# Patient Record
Sex: MALE | URBAN - METROPOLITAN AREA
[De-identification: names, ages, dates, MRNs, and addresses within clinical notes are randomized per-mention and may not be internally consistent; named-entity substitution may affect disease eponyms.]

---

## 2017-10-26 ENCOUNTER — APPOINTMENT (RX ONLY)
Dept: URBAN - METROPOLITAN AREA CLINIC 15 | Facility: CLINIC | Age: 67
Setting detail: DERMATOLOGY
End: 2017-10-26

## 2017-10-26 DIAGNOSIS — Z41.9 ENCOUNTER FOR PROCEDURE FOR PURPOSES OTHER THAN REMEDYING HEALTH STATE, UNSPECIFIED: ICD-10-CM

## 2017-10-26 PROCEDURE — ? BOTOX

## 2017-10-26 PROCEDURE — ? COSMETIC CONSULTATION: BOTULINUM TOXIN

## 2017-10-26 PROCEDURE — ? ADDITIONAL NOTES

## 2017-10-26 PROCEDURE — ? COSMETIC CONSULTATION: FILLERS

## 2017-10-26 PROCEDURE — ? FILLERS

## 2017-10-26 ASSESSMENT — LOCATION DETAILED DESCRIPTION DERM
LOCATION DETAILED: LEFT SUPERIOR VERMILION BORDER
LOCATION DETAILED: LEFT INFERIOR MEDIAL BUCCAL CHEEK
LOCATION DETAILED: LEFT LATERAL MALAR CHEEK
LOCATION DETAILED: RIGHT LOWER CUTANEOUS LIP
LOCATION DETAILED: LEFT UPPER CUTANEOUS LIP
LOCATION DETAILED: RIGHT INFERIOR MEDIAL MALAR CHEEK
LOCATION DETAILED: LEFT INFERIOR MEDIAL MALAR CHEEK
LOCATION DETAILED: RIGHT CENTRAL MALAR CHEEK
LOCATION DETAILED: LEFT MEDIAL MALAR CHEEK
LOCATION DETAILED: RIGHT UPPER CUTANEOUS LIP
LOCATION DETAILED: RIGHT LATERAL MALAR CHEEK
LOCATION DETAILED: RIGHT ORAL COMMISSURE
LOCATION DETAILED: LEFT LOWER CUTANEOUS LIP
LOCATION DETAILED: RIGHT MID PREAURICULAR CHEEK
LOCATION DETAILED: RIGHT SUPERIOR MEDIAL BUCCAL CHEEK
LOCATION DETAILED: RIGHT MEDIAL MALAR CHEEK

## 2017-10-26 ASSESSMENT — LOCATION SIMPLE DESCRIPTION DERM
LOCATION SIMPLE: LEFT LIP
LOCATION SIMPLE: LEFT UPPER LIP
LOCATION SIMPLE: LEFT CHEEK
LOCATION SIMPLE: RIGHT LIP
LOCATION SIMPLE: RIGHT CHEEK

## 2017-10-26 ASSESSMENT — LOCATION ZONE DERM
LOCATION ZONE: FACE
LOCATION ZONE: LIP

## 2017-10-26 NOTE — PROCEDURE: FILLERS
Decollete Filler  Volume In Cc: 0
Tear Troughs Filler  Volume In Cc: 3
Additional Area 1 Location: corners of the mouth and marionette lines
Filler: Voluma
Use Map Statement For Sites (Optional): No
Lot #: [id number]
Filler: Juvederm Ultra XC
Detail Level: Simple
Filler: Juvederm Ultra Plus XC
Lot #: [id number]
Expiration Date (Month Year): 2018/11
Map Statment: See Attached Map for Complete Details
Additional Area 1 Location: corners of mouth
Additional Area 1 Location: corners of mouth and fine lines on upper lip
Additional Area 1 Volume In Cc: 0.4
Lot #: [id number]
Topical Anesthesia?: 20% benzocaine, 8% lidocaine, 4% tetracaine
Cheeks Filler Volume In Cc: 2
Lot #: [id number]
Filler: Radiesse+
Consent: Written consent obtained. Risks include but not limited to bruising, beading, irregular texture, ulceration, infection, allergic reaction, scar formation, incomplete augmentation, temporary nature, procedural pain.
Nasolabial Folds Filler Volume In Cc: 0.6
Post-Care Instructions: Patient instructed to apply ice to reduce swelling.
Expiration Date (Month Year): 2019/06/20

## 2017-10-26 NOTE — PROCEDURE: ADDITIONAL NOTES
Additional Notes: Botox 3 areas$ 680\\n2 Volumas $2000\\n1 Juvederm ultra plus $ 700\\n1 Juvederm ultra $600\\n2 Radiesse $1500

## 2017-10-26 NOTE — PROCEDURE: BOTOX
Anterior Platysmal Bands Units: 0
Dilution (U/0.1 Cc): 2.5
Forehead Units: [address] [facility]
Post-Care Instructions: Patient instructed to not lie down for 4 hours and limit physical activity for 24 hours. Patient instructed not to travel by airplane for 48 hours.
Consent: Written consent obtained. Risks include but not limited to lid/brow ptosis, bruising, swelling, diplopia, temporary effect, incomplete chemical denervation.
Detail Level: Simple
Lot #: [id number]
Expiration Date (Month Year): 4/20
Periorbital Skin Units: 25
Glabellar Complex Units: 15

## 2018-01-25 ENCOUNTER — APPOINTMENT (RX ONLY)
Dept: URBAN - METROPOLITAN AREA CLINIC 15 | Facility: CLINIC | Age: 68
Setting detail: DERMATOLOGY
End: 2018-01-25

## 2018-01-25 DIAGNOSIS — Z41.9 ENCOUNTER FOR PROCEDURE FOR PURPOSES OTHER THAN REMEDYING HEALTH STATE, UNSPECIFIED: ICD-10-CM

## 2018-01-25 PROCEDURE — ? ADDITIONAL NOTES

## 2018-01-25 PROCEDURE — ? FILLERS

## 2018-01-25 ASSESSMENT — LOCATION DETAILED DESCRIPTION DERM
LOCATION DETAILED: LEFT INFERIOR MEDIAL BUCCAL CHEEK
LOCATION DETAILED: LEFT LATERAL MALAR CHEEK
LOCATION DETAILED: LEFT CENTRAL MALAR CHEEK
LOCATION DETAILED: RIGHT LOWER CUTANEOUS LIP
LOCATION DETAILED: LEFT INFERIOR CENTRAL MALAR CHEEK
LOCATION DETAILED: RIGHT INFERIOR CENTRAL MALAR CHEEK
LOCATION DETAILED: RIGHT LATERAL MALAR CHEEK
LOCATION DETAILED: LEFT SUPERIOR MEDIAL BUCCAL CHEEK
LOCATION DETAILED: RIGHT SUPERIOR LATERAL BUCCAL CHEEK
LOCATION DETAILED: LEFT SUPERIOR LATERAL BUCCAL CHEEK

## 2018-01-25 ASSESSMENT — LOCATION SIMPLE DESCRIPTION DERM
LOCATION SIMPLE: RIGHT CHEEK
LOCATION SIMPLE: LEFT CHEEK
LOCATION SIMPLE: RIGHT LIP

## 2018-01-25 ASSESSMENT — LOCATION ZONE DERM
LOCATION ZONE: FACE
LOCATION ZONE: LIP

## 2018-01-25 NOTE — PROCEDURE: FILLERS
Nasolabial Folds Filler Volume In Cc: 0
Tear Troughs Filler  Volume In Cc: 1
Additional Anesthesia Volume In Cc: 6
Expiration Date (Month Year): 2019/ 03/ 18
Topical Anesthesia?: 20% benzocaine, 8% lidocaine, 4% tetracaine
Additional Area 1 Location: fine lines on cutaneous lip.
Use Map Statement For Sites (Optional): No
Lot #: [id number]
Post-Care Instructions: Patient instructed to apply ice to reduce swelling.
Detail Level: Simple
Filler: Radiesse+
Lot #: [id number]
Additional Area 1 Location: corner of the mouth
Expiration Date (Month Year): 2019/03/05
Expiration Date (Month Year): 2019/07/19
Additional Area 1 Location: Corners of the mouth
Cheeks Filler Volume In Cc: 2
Lot #: [id number]
Consent: Written consent obtained. Risks include but not limited to bruising, beading, irregular texture, ulceration, infection, allergic reaction, scar formation, incomplete augmentation, temporary nature, procedural pain.
Map Statment: See Attached Map for Complete Details
Expiration Date (Month Year): 2018/12/05
Anesthesia Volume In Cc: 0.5
Filler: Juvederm Ultra Plus
Filler: Voluma
Lot #: [id number]

## 2018-05-16 ENCOUNTER — APPOINTMENT (RX ONLY)
Dept: URBAN - METROPOLITAN AREA CLINIC 15 | Facility: CLINIC | Age: 68
Setting detail: DERMATOLOGY
End: 2018-05-16

## 2018-05-16 DIAGNOSIS — Z41.9 ENCOUNTER FOR PROCEDURE FOR PURPOSES OTHER THAN REMEDYING HEALTH STATE, UNSPECIFIED: ICD-10-CM

## 2018-05-16 PROCEDURE — ? FILLERS

## 2018-05-16 NOTE — HPI: COSMETIC (FILLERS)
Have You Had Fillers Before?: has had fillers
Additional History: The patient would like to restitute the volume he has been losing on his cheeks and Marionettesâlines.

## 2018-05-16 NOTE — PROCEDURE: FILLERS
Mid Face Filler  Volume In Cc: 0
Lot #: [id number]
Expiration Date (Month Year): 2019/05
Expiration Date (Month Year): 2019/06/28
Topical Anesthesia?: 20% benzocaine, 8% lidocaine, 4% tetracaine
Consent: Written consent obtained. Risks include but not limited to bruising, beading, irregular texture, ulceration, infection, allergic reaction, scar formation, incomplete augmentation, temporary nature, procedural pain.
Use Map Statement For Sites (Optional): No
Lot #: [id number]
Additional Area 3 Location: corners of the mouth
Additional Anesthesia Volume In Cc: 6
Post-Care Instructions: Patient instructed to apply ice to reduce swelling.
Anesthesia Volume In Cc: 0.5
Map Statment: See Attached Map for Complete Details
Detail Level: Simple
Filler: Voluma
Lot #: [id number]
Expiration Date (Month Year): 2019-06-28
Lot #: [id number]
Additional Area 1 Location: nasal bridge
Expiration Date (Month Year): 2019/04
Lot #: [id number]
Cheeks Filler Volume In Cc: 2
Filler: Juvederm Ultra
Cheeks Filler Volume In Cc: 1.5
Lot #: [id number]
Filler: Radiesse+
Lot #: [id number]
Filler: Juvederm Ultra Plus
Lot #: [id number]
Additional Area 1 Location: nasolabial folds and Marionettesâ lines.
Nasolabial Folds Filler Volume In Cc: 1

## 2019-01-22 ENCOUNTER — APPOINTMENT (RX ONLY)
Dept: URBAN - METROPOLITAN AREA CLINIC 15 | Facility: CLINIC | Age: 69
Setting detail: DERMATOLOGY
End: 2019-01-22

## 2019-04-24 ENCOUNTER — APPOINTMENT (RX ONLY)
Dept: URBAN - METROPOLITAN AREA CLINIC 15 | Facility: CLINIC | Age: 69
Setting detail: DERMATOLOGY
End: 2019-04-24

## 2019-04-24 DIAGNOSIS — Z41.9 ENCOUNTER FOR PROCEDURE FOR PURPOSES OTHER THAN REMEDYING HEALTH STATE, UNSPECIFIED: ICD-10-CM

## 2019-04-24 PROCEDURE — ? ADDITIONAL NOTES

## 2019-04-24 PROCEDURE — ? BOTOX

## 2019-04-24 PROCEDURE — ? FILLERS

## 2019-04-24 ASSESSMENT — LOCATION DETAILED DESCRIPTION DERM
LOCATION DETAILED: GLABELLA
LOCATION DETAILED: LEFT CENTRAL MALAR CHEEK
LOCATION DETAILED: RIGHT SUPERIOR MEDIAL BUCCAL CHEEK
LOCATION DETAILED: RIGHT INFERIOR MEDIAL FOREHEAD
LOCATION DETAILED: LEFT SUPERIOR MEDIAL FOREHEAD
LOCATION DETAILED: LEFT SUPERIOR MEDIAL BUCCAL CHEEK
LOCATION DETAILED: LEFT INFERIOR MEDIAL FOREHEAD
LOCATION DETAILED: RIGHT INFERIOR TEMPLE
LOCATION DETAILED: RIGHT INFERIOR MEDIAL MALAR CHEEK
LOCATION DETAILED: RIGHT SUPERIOR FOREHEAD
LOCATION DETAILED: LEFT INFERIOR TEMPLE
LOCATION DETAILED: SUPERIOR MID FOREHEAD
LOCATION DETAILED: RIGHT CENTRAL MALAR CHEEK
LOCATION DETAILED: LEFT LATERAL FOREHEAD
LOCATION DETAILED: RIGHT MID TEMPLE
LOCATION DETAILED: LEFT FOREHEAD
LOCATION DETAILED: LEFT UPPER CUTANEOUS LIP
LOCATION DETAILED: RIGHT LATERAL FOREHEAD
LOCATION DETAILED: RIGHT SUPERIOR LATERAL MALAR CHEEK
LOCATION DETAILED: LEFT SUPERIOR LATERAL FOREHEAD
LOCATION DETAILED: LEFT MID TEMPLE
LOCATION DETAILED: LEFT SUPERIOR LATERAL MALAR CHEEK

## 2019-04-24 ASSESSMENT — LOCATION SIMPLE DESCRIPTION DERM
LOCATION SIMPLE: SUPERIOR FOREHEAD
LOCATION SIMPLE: LEFT CHEEK
LOCATION SIMPLE: LEFT LIP
LOCATION SIMPLE: LEFT TEMPLE
LOCATION SIMPLE: RIGHT FOREHEAD
LOCATION SIMPLE: RIGHT CHEEK
LOCATION SIMPLE: GLABELLA
LOCATION SIMPLE: RIGHT TEMPLE
LOCATION SIMPLE: LEFT FOREHEAD

## 2019-04-24 ASSESSMENT — LOCATION ZONE DERM
LOCATION ZONE: LIP
LOCATION ZONE: FACE

## 2019-04-24 NOTE — PROCEDURE: FILLERS
Cheeks Filler Volume In Cc: 1
Additional Area 4 Volume In Cc: 0
Lot #: [id number]
Expiration Date (Month Year): 2020-04-20
Lot #: [id number]
Include Cannula Information In Note?: Yes
Expiration Date (Month Year): 2020-02-26
Detail Level: Detailed
Include Cannula Information In Note?: No
Expiration Date (Month Year): 2020-01-30
Include Cannula Brand?: DermaSculpt
Consent: Written consent obtained. Risks include but not limited to bruising, beading, irregular texture, ulceration, infection, allergic reaction, scar formation, incomplete augmentation, temporary nature, procedural pain.
Include Cannula Size?: 27G
Post-Care Instructions: Patient instructed to apply ice to reduce swelling.
Additional Area 1 Location: [facility]
Anesthesia Volume In Cc: 0.5
Lot #: [id number]
Additional Area 1 Location: corners of the mouth
Lot #: [id number]
Additional Area 1 Volume In Cc: 0.4
Additional Area 1 Location: lateral cheeks.
Expiration Date (Month Year): 2019/03
Map Statment: See Attached Map for Complete Details
Additional Area 2 Location: corner of the mouth
Additional Anesthesia Volume In Cc: 6
Additional Area 1 Location: Corners of the mouth and lips
Additional Area 2 Location: to lift the corners of the mouth and to finish filling the cheeks.
Filler: Juvederm Ultra XC
Additional Area 2 Location: to fill the upper and lower cutaneous lips lines.
Filler: Voluma
Lot #: [id number]
Nasolabial Folds Filler Volume In Cc: 0.6

## 2019-04-24 NOTE — PROCEDURE: ADDITIONAL NOTES
Detail Level: Zone
Additional Notes: . \\nFillers.  \\n1-Juvederm:$600 \\n1-Radiesse:$750 \\n1-Voluma:$1000 \\n\\n3 areas of Botox:$680

## 2019-04-24 NOTE — PROCEDURE: BOTOX
[place] Units: 0
Glabellar Complex Units: 15
Forehead Units: [address] [facility]
Additional Area 1 Location: bunny lines
Detail Level: Zone
Post-Care Instructions: Patient instructed to not lie down for 4 hours and limit physical activity for 24 hours. Patient instructed not to travel by airplane for 48 hours.
Dilution (U/0.1 Cc): 4
Expiration Date (Month Year): 08/2021
Lot #: [id number]
Nasal Root Units: 5
Periorbital Skin Units: 20
Additional Area 2 Location: [facility]
Consent: Written consent obtained. Risks include but not limited to lid/brow ptosis, bruising, swelling, diplopia, temporary effect, incomplete chemical denervation.
Additional Area 4 Location: nasal tip lifting injection :2.5 Units.